# Patient Record
Sex: MALE | Employment: FULL TIME | URBAN - METROPOLITAN AREA
[De-identification: names, ages, dates, MRNs, and addresses within clinical notes are randomized per-mention and may not be internally consistent; named-entity substitution may affect disease eponyms.]

---

## 2017-01-10 ENCOUNTER — OFFICE VISIT (OUTPATIENT)
Dept: CARDIOLOGY CLINIC | Age: 72
End: 2017-01-10

## 2017-01-10 VITALS
BODY MASS INDEX: 23.1 KG/M2 | WEIGHT: 161 LBS | SYSTOLIC BLOOD PRESSURE: 134 MMHG | OXYGEN SATURATION: 97 % | HEART RATE: 99 BPM | DIASTOLIC BLOOD PRESSURE: 80 MMHG | RESPIRATION RATE: 17 BRPM

## 2017-01-10 DIAGNOSIS — I25.10 ATHEROSCLEROSIS OF NATIVE CORONARY ARTERY OF NATIVE HEART WITHOUT ANGINA PECTORIS: Primary | ICD-10-CM

## 2017-01-10 DIAGNOSIS — Z95.1 S/P CABG X 4: ICD-10-CM

## 2017-01-10 DIAGNOSIS — E78.5 DYSLIPIDEMIA: ICD-10-CM

## 2017-01-10 RX ORDER — GABAPENTIN 600 MG/1
TABLET ORAL 3 TIMES DAILY
COMMUNITY

## 2017-01-10 RX ORDER — NORTRIPTYLINE HYDROCHLORIDE 50 MG/1
50 CAPSULE ORAL
COMMUNITY
End: 2018-09-11

## 2017-01-10 NOTE — PROGRESS NOTES
HISTORY OF PRESENT ILLNESS  Fabian Winn is a 80 y.o. male. Last seen 3 years ago. Problem List  Date Reviewed: 9/28/2014          Codes Class Noted    Coronary atherosclerosis of native coronary artery ICD-10-CM: I25.10  ICD-9-CM: 414.01  4/7/2011    Overview Signed 7/7/2011  3:23 PM by Nyasia Gerard MD     nonSTEMI June 2007  3v CAD/normal EF  4v CABG June 2007 ([facility] @ [address] [place] [address])  - LIMA-LAD, VG-diag, VG-OM, VG-distal RCA             S/P CABG x 4 ICD-10-CM: Z95.1  ICD-9-CM: V45.81  Unknown    Overview Signed 9/28/2014 12:12 PM by Nyasia Gerard MD     - LIMA-LAD, VG-diag, VG-OM, VG-distal RCA             Dyslipidemia ICD-10-CM: E78.5  ICD-9-CM: 272.4  9/23/2014            Cardiac testing  Echo 2d adult 2007 - EF 60%, normal wall motion   Us lower ext art bilat with pvr exercise 2007 - normal   Stress test cardiolite 6/26/2008 - normal perfusion, EF 52%   Stress echo 10/16/14- 9 minutes normal stress echo. Palpitations    Pertinent negatives include no diaphoresis, no fever, no malaise/fatigue, no chest pain, no claudication, no orthopnea, no PND, no nausea, no vomiting, no headaches, no back pain, no weakness, no cough, no hemoptysis, no shortness of breath and no sputum production. His past medical history is significant for hypertension. Hypertension   Pertinent negatives include no chest pain, no headaches and no shortness of breath. HPI    He remains active with regular exercise at the gym. No exertional symptoms. He is eating well and keeping himself hydrated. His BP at home run in the 120/80s range. He is adherent with Metoprolol. Patient denies any exertional chest pain, dyspnea, palpitations, syncope, orthopnea, edema or paroxysmal nocturnal dyspnea. He has chronic bilateral neuropathic LE pain often worse with standing. His symptoms somewhat improved with Neurontin and Nortriptyline. No claudication symptoms.      Current Outpatient Prescriptions   Medication Sig Dispense Refill    nortriptyline (PAMELOR) 50 mg capsule Take 50 mg by mouth nightly.  gabapentin (NEURONTIN) 600 mg tablet Take  by mouth three (3) times daily.  metoprolol (LOPRESSOR) 25 mg tablet Take  by mouth daily.  alprazolam (XANAX) 0.5 mg tablet Take 0.25 mg by mouth.  DOCOSAHEXANOIC ACID/EPA (FISH OIL PO) Take  by mouth.  ASPIRIN/ACETAMINOPHEN/CAFFEINE (EXCEDRIN EXTRA STRENGTH PO) Take  by mouth as needed.  UBIDECARENONE/VITAMIN E MIXED (COQ10  PO) Take  by mouth.  simvastatin (ZOCOR) 40 mg tablet Take 1 Tab by mouth nightly. 90 Tab 3     No Known Allergies    . Nonsmoker. Review of Systems   Constitutional: Negative for chills, diaphoresis, fever and malaise/fatigue. Respiratory: Negative for cough, hemoptysis, sputum production, shortness of breath and wheezing. Cardiovascular: Positive for palpitations. Negative for chest pain, orthopnea, claudication, leg swelling and PND. Gastrointestinal: Negative for blood in stool, heartburn, melena, nausea and vomiting. Genitourinary: Negative for dysuria and flank pain. Musculoskeletal: Negative for back pain and joint pain. Chronic leg pain   Skin: Negative for rash. Neurological: Negative for focal weakness, seizures, loss of consciousness, weakness and headaches. Endo/Heme/Allergies: Does not bruise/bleed easily. Psychiatric/Behavioral: Negative for memory loss. The patient does not have insomnia. Visit Vitals    /80 (BP 1 Location: Left arm, BP Patient Position: At rest)    Pulse 99    Resp 17    Wt 161 lb (73 kg)    SpO2 97%    BMI 23.1 kg/m2     Wt Readings from Last 3 Encounters:   01/10/17 161 lb (73 kg)   09/23/14 158 lb (71.7 kg)   07/07/11 157 lb (71.2 kg)      Physical Exam   Constitutional: He is oriented to person, place, and time. He appears well-developed. HENT:   Head: Normocephalic. Neck: Neck supple. No JVD present. No tracheal deviation present. Cardiovascular: Normal rate, regular rhythm, S1 normal and S2 normal.  PMI is not displaced. Exam reveals no gallop and no friction rub. No murmur heard. Pulses:       Carotid pulses are 2+ on the right side, and 2+ on the left side. Pulmonary/Chest: Effort normal and breath sounds normal. He has no decreased breath sounds. He has no wheezes. He has no rhonchi. He has no rales. Abdominal: Soft. Normal appearance and normal aorta. There is no tenderness. There is no rebound. Musculoskeletal: He exhibits no edema. Neurological: He is alert and oriented to person, place, and time. Skin: Skin is warm and dry. Psychiatric: He has a normal mood and affect. Vitals reviewed. Cardiographics  EKG 7/2011- SR, normal tracing  EKG 9/23/14 - SR, inferolateral Q waves, unchanged   EKG 01/10/17- SR old inferior MI, non-specific T-wave flattening no significant change from 09/23/14. Stress echo 10/16/14- 9 minutes normal stress echo. ASSESSMENT and PLAN  Encounter Diagnoses   Name Primary?  Atherosclerosis of native coronary artery of native heart without angina pectoris Yes    Dyslipidemia     S/P CABG x 4      Mr. Flaco Markham has CAD s/p CABG 2007. Stress echo in 2014 was normal. He has no symptoms of angina at a good functional capacity. Since this is his 8 year anniversary of CABG, I favor repeat stress echo. Lipids monitored by Dr. Angely Ly. Phone follow up after reviewing tests. Assuming his stress test is normal, I would like to see him on an annual basis. Follow-up Disposition:  Return in about 1 year (around 1/10/2018). Stress echo near future.      Written by Rinu Deadra Lesch, as dictated by Lavelle Monsivais MD.   Lvaelle Monsivais MD

## 2017-01-10 NOTE — MR AVS SNAPSHOT
Visit Information Date & Time Provider Department Dept. Phone Encounter #  
 1/10/2017  2:40 PM Loly Rodríguez MD [facility] [phone] [id number] Follow-up Instructions Return in about 1 year (around 1/10/2018). Your Appointments 1/19/2017  3:00 PM  
ECHO CARDIOGRAMS 2D with ECHO, STFRANCIS  
[facility] ([facility] SCHEDULING) Appt Note: stress echo per dr Ahmet Heath dx:  
 [address] [address] [facility] [place] [address]  
[phone]  
  
   
 [address] [facility] [address]  
  
    
 1/19/2017  3:00 PM  
STRESS ECHOCARDIOGRAMS with KACI GANNON  
[facility] ([facility]) Appt Note: stress echo per dr Ahmet Heath dx:  
 [address] [place] [address] [facility]  
[address] [facility] [place] [address] [address] [place] [address] Upcoming Health Maintenance Date Due Hepatitis C Screening 1945 DTaP/Tdap/Td series (1 - Tdap) 7/20/1967 FOBT Q 1 YEAR AGE 50-75 7/20/1996 ZOSTER VACCINE AGE 60> 7/20/2006 GLAUCOMA SCREENING Q2Y 7/20/2011 Pneumococcal 65+ Low/Medium Risk (1 of 2 - PCV13) 7/20/2011 MEDICARE YEARLY EXAM 7/20/2011 INFLUENZA AGE 9 TO ADULT 8/1/2016 Allergies as of 1/10/2017  Review Complete On: 10/16/2014 By: Liz Purvis RN No Known Allergies Current Immunizations  Never Reviewed No immunizations on file. Not reviewed this visit You Were Diagnosed With   
  
 Codes Comments Dyslipidemia    -  Primary ICD-10-CM: E78.5 ICD-9-CM: 272.4 Atherosclerosis of native coronary artery without angina pectoris, unspecified whether native or transplanted heart     ICD-10-CM: I25.10 ICD-9-CM: 414.01 S/P CABG x 4     ICD-10-CM: Z95.1 ICD-9-CM: V45.81 Vitals BP Pulse Resp Weight(growth percentile) SpO2 BMI 134/80 (BP 1 Location: Left arm, BP Patient Position: At rest) 99 17 161 lb (73 kg) 97% 23.1 kg/m2 Smoking Status Never Smoker BMI and BSA Data Body Mass Index Body Surface Area  
 23.1 kg/m 2 1.9 m 2 Preferred Pharmacy Pharmacy Name Phone [address] [facility], [address] [place] [address] Your Updated Medication List  
  
   
This list is accurate as of: 1/10/17  4:03 PM.  Always use your most recent med list.  
  
  
  
  
 COQ10  PO Take  by mouth. EXCEDRIN EXTRA STRENGTH PO Take  by mouth as needed. FISH OIL PO Take  by mouth.  
  
 gabapentin 600 mg tablet Commonly known as:  NEURONTIN Take  by mouth three (3) times daily. metoprolol tartrate 25 mg tablet Commonly known as:  LOPRESSOR Take  by mouth daily. nortriptyline 50 mg capsule Commonly known as:  PAMELOR Take 50 mg by mouth nightly. simvastatin 40 mg tablet Commonly known as:  ZOCOR Take 1 Tab by mouth nightly. XANAX 0.5 mg tablet Generic drug:  ALPRAZolam  
Take 0.25 mg by mouth. We Performed the Following AMB POC EKG ROUTINE W/ 12 LEADS, INTER & REP [38299 CPT(R)] Follow-up Instructions Return in about 1 year (around 1/10/2018). Introducing [facility] & HEALTH SERVICES! Na Etienne introduces [employer] patient portal. Now you can access parts of your medical record, email your doctor's office, and request medication refills online. 1. In your internet browser, go to [email or web address] 2. Click on the First Time User? Click Here link in the Sign In box. You will see the New Member Sign Up page. 3. Enter your [employer] Access Code exactly as it appears below. You will not need to use this code after youve completed the sign-up process. If you do not sign up before the expiration date, you must request a new code.  
 
· [employer] Access Code: [email or web address] 
 Expires: 4/10/2017  3:59 PM 
 
4. Enter the last four digits of your Social Security Number (xxxx) and Date of Birth (mm/dd/yyyy) as indicated and click Submit. You will be taken to the next sign-up page. 5. Create a Med[employer]acs ID. This will be your [employer] login ID and cannot be changed, so think of one that is secure and easy to remember. 6. Create a [employer] password. You can change your password at any time. 7. Enter your Password Reset Question and Answer. This can be used at a later time if you forget your password. 8. Enter your e-mail address. You will receive e-mail notification when new information is available in [address] 9. Click Sign Up. You can now view and download portions of your medical record. 10. Click the Download Summary menu link to download a portable copy of your medical information. If you have questions, please visit the Frequently Asked Questions section of the [employer] website. Remember, [employer] is NOT to be used for urgent needs. For medical emergencies, dial 911. Now available from your iPhone and Android! Please provide this summary of care documentation to your next provider. Your primary care clinician is listed as Braulio Duong. If you have any questions after today's visit, please call [phone].

## 2017-01-18 ENCOUNTER — TELEPHONE (OUTPATIENT)
Dept: CARDIOLOGY CLINIC | Age: 72
End: 2017-01-18

## 2017-01-18 NOTE — TELEPHONE ENCOUNTER
Please return call at [phone]. Please in form pt, when he calls, no caffiene tomorrow, hold metolprolol in am, NPO after 1pm for 3pm stress echo.

## 2018-09-11 ENCOUNTER — OFFICE VISIT (OUTPATIENT)
Dept: CARDIOLOGY CLINIC | Age: 73
End: 2018-09-11

## 2018-09-11 VITALS
HEART RATE: 68 BPM | WEIGHT: 163.8 LBS | SYSTOLIC BLOOD PRESSURE: 148 MMHG | DIASTOLIC BLOOD PRESSURE: 74 MMHG | BODY MASS INDEX: 23.45 KG/M2 | HEIGHT: 70 IN | RESPIRATION RATE: 16 BRPM

## 2018-09-11 DIAGNOSIS — I25.10 ATHEROSCLEROSIS OF NATIVE CORONARY ARTERY OF NATIVE HEART WITHOUT ANGINA PECTORIS: ICD-10-CM

## 2018-09-11 DIAGNOSIS — Z95.1 S/P CABG X 4: ICD-10-CM

## 2018-09-11 DIAGNOSIS — E78.5 DYSLIPIDEMIA: Primary | ICD-10-CM

## 2018-09-11 RX ORDER — B-COMPLEX WITH VITAMIN C
1 TABLET ORAL DAILY
COMMUNITY

## 2018-09-11 RX ORDER — ACETAMINOPHEN 500 MG
2000 TABLET ORAL DAILY
COMMUNITY

## 2018-09-11 NOTE — PATIENT INSTRUCTIONS
Check your blood pressure at home. You can purchase a blood pressure cuff at [facility]. Call us in a few weeks and let us know values.

## 2018-09-11 NOTE — MR AVS SNAPSHOT
[address] [address] [address] [facility] 
[phone] Patient: Thanh Horowitz MRN: [id number] Visit Information Date & Time Provider Department Dept. Phone Encounter #  
 9/11/2018  2:40 PM Chiki Bermudez MD [facility] [place] [phone] [id number] Follow-up Instructions Return in about 1 year (around 9/11/2019). Upcoming Health Maintenance Date Due Hepatitis C Screening 1945 DTaP/Tdap/Td series (1 - Tdap) 7/20/1967 FOBT Q 1 YEAR AGE 50-75 7/20/1996 ZOSTER VACCINE AGE 60> 5/20/2006 GLAUCOMA SCREENING Q2Y 7/20/2011 Pneumococcal 65+ Low/Medium Risk (1 of 2 - PCV13) 7/20/2011 MEDICARE YEARLY EXAM 3/14/2018 Influenza Age 5 to Adult 8/1/2018 Allergies as of 9/11/2018  Review Complete On: 9/11/2018 By: Bruce Akers No Known Allergies Current Immunizations  Never Reviewed No immunizations on file. Not reviewed this visit You Were Diagnosed With   
  
 Codes Comments Dyslipidemia    -  Primary ICD-10-CM: E78.5 ICD-9-CM: 272.4 S/P CABG x 4     ICD-10-CM: Z95.1 ICD-9-CM: V45.81 Atherosclerosis of native coronary artery of native heart without angina pectoris     ICD-10-CM: I25.10 ICD-9-CM: 414.01 Vitals BP Pulse Resp Height(growth percentile) Weight(growth percentile) BMI  
 148/74 (BP 1 Location: Left arm, BP Patient Position: Sitting) 68 16 5' 10\" (1.778 m) 163 lb 12.8 oz (74.3 kg) 23.5 kg/m2 Smoking Status Never Smoker Vitals History BMI and BSA Data Body Mass Index Body Surface Area  
 23.5 kg/m 2 1.92 m 2 Preferred Pharmacy Pharmacy Name Phone [address] [facility], [address] [place] [address] [address] [place] Your Updated Medication List  
  
   
This list is accurate as of 9/11/18  3:28 PM.  Always use your most recent med list.  
  
  
 b-complex with vitamin c tablet Take 1 Tab by mouth daily. COQ10  PO Take 100 mg by mouth daily. dietary supplement Cap Take 450 mg by mouth daily. Indications: TUMERIC  
  
 EXCEDRIN EXTRA STRENGTH PO Take 325 mg by mouth daily. FISH OIL PO Take 1,200 mg by mouth daily. gabapentin 600 mg tablet Commonly known as:  NEURONTIN Take  by mouth three (3) times daily. metoprolol tartrate 25 mg tablet Commonly known as:  LOPRESSOR Take  by mouth daily. simvastatin 40 mg tablet Commonly known as:  ZOCOR Take 1 Tab by mouth nightly. TRIPLE MAGNESIUM COMPLEX 400 mg Cap Generic drug:  magnesium oxide,aspartate,citr Take 400 mg by mouth daily. VITAMIN D3 2,000 unit Cap capsule Generic drug:  Cholecalciferol (Vitamin D3) Take 2,000 Units by mouth daily. XANAX 0.5 mg tablet Generic drug:  ALPRAZolam  
Take 0.25 mg by mouth daily. We Performed the Following AMB POC EKG ROUTINE W/ 12 LEADS, INTER & REP [39706 CPT(R)] Follow-up Instructions Return in about 1 year (around 9/11/2019). Patient Instructions Check your blood pressure at home. You can purchase a blood pressure cuff at [facility]. Call us in a few weeks and let us know values. Introducing [facility] & HEALTH SERVICES! Pancho Bond introduces [facility] [employer] patient portal. Now you can access parts of your medical record, email your doctor's office, and request medication refills online. 1. In your internet browser, go to [email or web address] 2. Click on the First Time User? Click Here link in the Sign In box. You will see the New Member Sign Up page. 3. Enter your [facility] Access Code exactly as it appears below. You will not need to use this code after youve completed the sign-up process. If you do not sign up before the expiration date, you must request a new code. · [facility] Access Code: [email or web address] Expires: 12/10/2018  3:28 PM 
 
4. Enter the last four digits of your Social Security Number (xxxx) and Date of Birth (mm/dd/yyyy) as indicated and click Submit. You will be taken to the next sign-up page. 5. Create a [employer] ID. This will be your [employer] login ID and cannot be changed, so think of one that is secure and easy to remember. 6. Create a [employer] password. You can change your password at any time. 7. Enter your Password Reset Question and Answer. This can be used at a later time if you forget your password. 8. Enter your e-mail address. You will receive e-mail notification when new information is available in [address] 9. Click Sign Up. You can now view and download portions of your medical record. 10. Click the Download Summary menu link to download a portable copy of your medical information. If you have questions, please visit the Frequently Asked Questions section of the [employer] website. Remember, [employer] is NOT to be used for urgent needs. For medical emergencies, dial 911. Now available from your iPhone and Android! Please provide this summary of care documentation to your next provider. Your primary care clinician is listed as Heather Mcleod. If you have any questions after today's visit, please call [phone].

## 2018-09-11 NOTE — PROGRESS NOTES
HISTORY OF PRESENT ILLNESS  Chad Barbosa is a 68 y.o. male. Last seen 1.5 years ago. Problem List  Date Reviewed: 9/28/2014          Codes Class Noted    Coronary atherosclerosis of native coronary artery ICD-10-CM: I25.10  ICD-9-CM: 414.01  4/7/2011    Overview Signed 7/7/2011  3:23 PM by Sathya Raymundo MD     nonSTEMI June 2007  3v CAD/normal EF  4v CABG June 2007 ([facility] @ [address] [place] [address])  - LIMA-LAD, VG-diag, VG-OM, VG-distal RCA             S/P CABG x 4 ICD-10-CM: Z95.1  ICD-9-CM: V45.81  Unknown    Overview Signed 9/28/2014 12:12 PM by Sathya Raymundo MD     - LIMA-LAD, VG-diag, VG-OM, VG-distal RCA             Dyslipidemia ICD-10-CM: E78.5  ICD-9-CM: 272.4  9/23/2014            Cardiac testing  Echo 2d adult 2007 - EF 60%, normal wall motion   Us lower ext art bilat with pvr exercise 2007 - normal   Stress test cardiolite 6/26/2008 - normal perfusion, EF 52%   Stress echo 10/16/14- 9 minutes normal stress echo. HPI  Mr. Reva Taylor states he is doing well. He is active spending time with his 4 grandchildren. He goes to the gym 4-5x/week, with no exertional symptoms. He does not check his blood pressure at home. Patient denies any exertional chest pain, dyspnea, palpitations, syncope, orthopnea, edema or paroxysmal nocturnal dyspnea. His lipids are followed by Braulio Duong MD. He is adherent with 1 Excedrin/day due to leg pain. He is here today with his wife. Current Outpatient Prescriptions   Medication Sig Dispense Refill    magnesium oxide,aspartate,citr (TRIPLE MAGNESIUM COMPLEX) 400 mg cap Take 400 mg by mouth daily.  dietary supplement cap Take 450 mg by mouth daily. Indications: TUMERIC      Cholecalciferol, Vitamin D3, (VITAMIN D3) 2,000 unit cap capsule Take 2,000 Units by mouth daily.  b-complex with vitamin c tablet Take 1 Tab by mouth daily.  gabapentin (NEURONTIN) 600 mg tablet Take  by mouth three (3) times daily.       metoprolol (LOPRESSOR) 25 mg tablet Take  by mouth daily.  alprazolam (XANAX) 0.5 mg tablet Take 0.25 mg by mouth daily.  DOCOSAHEXANOIC ACID/EPA (FISH OIL PO) Take 1,200 mg by mouth daily.  ASPIRIN/ACETAMINOPHEN/CAFFEINE (EXCEDRIN EXTRA STRENGTH PO) Take 325 mg by mouth daily.  UBIDECARENONE/VITAMIN E MIXED (COQ10  PO) Take 100 mg by mouth daily.  simvastatin (ZOCOR) 40 mg tablet Take 1 Tab by mouth nightly. 90 Tab 3     No Known Allergies    . Nonsmoker. Review of Systems   Constitutional: Negative for chills, diaphoresis, fever and malaise/fatigue. Respiratory: Negative for cough, hemoptysis, sputum production, shortness of breath and wheezing. Cardiovascular: Negative for chest pain, orthopnea, claudication, leg swelling and PND. Gastrointestinal: Negative for blood in stool, heartburn, melena, nausea and vomiting. Genitourinary: Negative for dysuria and flank pain. Musculoskeletal: Negative for back pain and joint pain. Chronic leg pain   Skin: Negative for rash. Neurological: Negative for focal weakness, seizures, loss of consciousness, weakness and headaches. Endo/Heme/Allergies: Does not bruise/bleed easily. Psychiatric/Behavioral: Negative for memory loss. The patient does not have insomnia. Visit Vitals    /74 (BP 1 Location: Left arm, BP Patient Position: Sitting)    Pulse 68    Resp 16    Ht 5' 10\" (1.778 m)    Wt 163 lb 12.8 oz (74.3 kg)    BMI 23.5 kg/m2     Wt Readings from Last 3 Encounters:   09/11/18 163 lb 12.8 oz (74.3 kg)   01/10/17 161 lb (73 kg)   09/23/14 158 lb (71.7 kg)      Physical Exam   Constitutional: He is oriented to person, place, and time. He appears well-developed. HENT:   Head: Normocephalic. Neck: Neck supple. No JVD present. No tracheal deviation present. Cardiovascular: Normal rate, regular rhythm, S1 normal and S2 normal.  PMI is not displaced. Exam reveals no gallop and no friction rub. No murmur heard.   Pulses:       Carotid pulses are 2+ on the right side, and 2+ on the left side. Pulmonary/Chest: Effort normal and breath sounds normal. He has no decreased breath sounds. He has no wheezes. He has no rhonchi. He has no rales. Abdominal: Soft. Normal appearance and normal aorta. There is no tenderness. There is no rebound. Musculoskeletal: He exhibits no edema. Neurological: He is alert and oriented to person, place, and time. Skin: Skin is warm and dry. Psychiatric: He has a normal mood and affect. Vitals reviewed. Cardiographics  EKG 7/2011- SR, normal tracing  EKG 9/23/14 - SR, inferolateral Q waves, unchanged   EKG 01/10/17- SR old inferior MI, non-specific T-wave flattening no significant change from 09/23/14. Stress echo 10/16/14- 9 minutes normal stress echo. EKG 9/11/18 - SR, non-diagnostic inferior Qs, unchanged from 1/10/17     ASSESSMENT and PLAN  Encounter Diagnoses   Name Primary?  Dyslipidemia Yes    S/P CABG x 4     Atherosclerosis of native coronary artery of native heart without angina pectoris      Mr. Bimal Rendon has CAD s/p CABG 2007. Stress echo in 2014 was normal. He has no symptoms of angina at a good functional capacity. We discussed repeat stress testing, but he would like to hold off for now. Low grade elevation in systolic BP on low dose Metoprolol. We discussed home BP monitoring. Should he require a medication change, I favor replacing Metoprolol with ACE-I. Lipids monitored by Dr. Nithin Wolfe. Follow-up Disposition:  Return in about 1 year (around 9/11/2019). Written by Marlen Kimble, as dictated by Dr. Krystal Salinas.      Krystal Salinas MD